# Patient Record
Sex: MALE | Race: WHITE | Employment: FULL TIME | URBAN - METROPOLITAN AREA
[De-identification: names, ages, dates, MRNs, and addresses within clinical notes are randomized per-mention and may not be internally consistent; named-entity substitution may affect disease eponyms.]

---

## 2020-07-21 ENCOUNTER — HOSPITAL ENCOUNTER (OUTPATIENT)
Age: 45
Discharge: HOME | End: 2020-07-21
Payer: COMMERCIAL

## 2020-07-22 ENCOUNTER — HOSPITAL ENCOUNTER (OUTPATIENT)
Age: 45
Discharge: HOME | End: 2020-07-22
Payer: COMMERCIAL

## 2020-07-22 PROCEDURE — 87109 MYCOPLASMA: CPT

## 2020-07-28 LAB
Lab: NORMAL
REPORT: NORMAL
THIS TEST SENT TO: NORMAL

## 2022-08-07 ENCOUNTER — HOSPITAL ENCOUNTER (EMERGENCY)
Age: 47
Discharge: HOME | End: 2022-08-07
Attending: EMERGENCY MEDICINE
Payer: COMMERCIAL

## 2022-08-07 VITALS
DIASTOLIC BLOOD PRESSURE: 88 MMHG | WEIGHT: 219.2 LBS | RESPIRATION RATE: 18 BRPM | HEIGHT: 73 IN | HEART RATE: 100 BPM | OXYGEN SATURATION: 98 % | TEMPERATURE: 99.4 F | SYSTOLIC BLOOD PRESSURE: 130 MMHG | BODY MASS INDEX: 29.05 KG/M2

## 2022-08-07 DIAGNOSIS — R19.7 NAUSEA VOMITING AND DIARRHEA: Primary | ICD-10-CM

## 2022-08-07 DIAGNOSIS — R11.2 NAUSEA VOMITING AND DIARRHEA: Primary | ICD-10-CM

## 2022-08-07 LAB
ABSOLUTE EOS #: ABNORMAL K/UL (ref 0–0.4)
ABSOLUTE LYMPH #: 0.49 K/UL (ref 1–4.8)
ABSOLUTE MONO #: 0.33 K/UL (ref 0–1)
ALBUMIN SERPL-MCNC: 4.1 G/DL (ref 3.5–5.2)
ALP BLD-CCNC: 109 U/L (ref 40–129)
ALT SERPL-CCNC: 72 U/L (ref 5–41)
ANION GAP SERPL CALCULATED.3IONS-SCNC: 10 MMOL/L (ref 9–17)
AST SERPL-CCNC: 41 U/L
BASOPHILS # BLD: ABNORMAL % (ref 0–2)
BASOPHILS ABSOLUTE: ABNORMAL K/UL (ref 0–0.2)
BILIRUB SERPL-MCNC: 0.81 MG/DL (ref 0.3–1.2)
BUN BLDV-MCNC: 19 MG/DL (ref 6–20)
BUN/CREAT BLD: 17 (ref 9–20)
CALCIUM SERPL-MCNC: 9 MG/DL (ref 8.6–10.4)
CHLORIDE BLD-SCNC: 106 MMOL/L (ref 98–107)
CO2: 26 MMOL/L (ref 20–31)
CREAT SERPL-MCNC: 1.12 MG/DL (ref 0.7–1.2)
EOSINOPHILS RELATIVE PERCENT: ABNORMAL % (ref 0–5)
GFR AFRICAN AMERICAN: >60 ML/MIN
GFR NON-AFRICAN AMERICAN: >60 ML/MIN
GFR SERPL CREATININE-BSD FRML MDRD: ABNORMAL ML/MIN/{1.73_M2}
GLUCOSE BLD-MCNC: 138 MG/DL (ref 70–99)
HCT VFR BLD CALC: 49.4 % (ref 41–53)
HEMOGLOBIN: 16.4 G/DL (ref 13.5–17.5)
LYMPHOCYTES # BLD: 6 % (ref 13–44)
MCH RBC QN AUTO: 27.1 PG (ref 26–34)
MCHC RBC AUTO-ENTMCNC: 33.2 G/DL (ref 31–37)
MCV RBC AUTO: 81.6 FL (ref 80–100)
MONOCYTES # BLD: 4 % (ref 5–9)
MORPHOLOGY: ABNORMAL
PDW BLD-RTO: 13.6 % (ref 12.1–15.2)
PLATELET # BLD: 186 K/UL (ref 140–450)
POTASSIUM SERPL-SCNC: 4.1 MMOL/L (ref 3.7–5.3)
RBC # BLD: 6.05 M/UL (ref 4.5–5.9)
SARS-COV-2, RAPID: NOT DETECTED
SEG NEUTROPHILS: 90 % (ref 39–75)
SEGMENTED NEUTROPHILS ABSOLUTE COUNT: 7.38 K/UL (ref 2.1–6.5)
SODIUM BLD-SCNC: 142 MMOL/L (ref 135–144)
SPECIMEN DESCRIPTION: NORMAL
TOTAL PROTEIN: 6.6 G/DL (ref 6.4–8.3)
WBC # BLD: 8.2 K/UL (ref 3.5–11)

## 2022-08-07 PROCEDURE — 2580000003 HC RX 258: Performed by: EMERGENCY MEDICINE

## 2022-08-07 PROCEDURE — C9803 HOPD COVID-19 SPEC COLLECT: HCPCS

## 2022-08-07 PROCEDURE — 6370000000 HC RX 637 (ALT 250 FOR IP): Performed by: EMERGENCY MEDICINE

## 2022-08-07 PROCEDURE — 87635 SARS-COV-2 COVID-19 AMP PRB: CPT

## 2022-08-07 PROCEDURE — 99284 EMERGENCY DEPT VISIT MOD MDM: CPT

## 2022-08-07 PROCEDURE — 85025 COMPLETE CBC W/AUTO DIFF WBC: CPT

## 2022-08-07 PROCEDURE — 6360000002 HC RX W HCPCS: Performed by: EMERGENCY MEDICINE

## 2022-08-07 PROCEDURE — 80053 COMPREHEN METABOLIC PANEL: CPT

## 2022-08-07 PROCEDURE — 96374 THER/PROPH/DIAG INJ IV PUSH: CPT

## 2022-08-07 RX ORDER — ALPRAZOLAM 1 MG/1
1 TABLET ORAL PRN
COMMUNITY

## 2022-08-07 RX ORDER — TRAZODONE HYDROCHLORIDE 50 MG/1
50 TABLET ORAL NIGHTLY PRN
COMMUNITY
Start: 2022-02-20

## 2022-08-07 RX ORDER — ONDANSETRON 4 MG/1
4 TABLET, ORALLY DISINTEGRATING ORAL ONCE
Status: COMPLETED | OUTPATIENT
Start: 2022-08-07 | End: 2022-08-07

## 2022-08-07 RX ORDER — ONDANSETRON 2 MG/ML
4 INJECTION INTRAMUSCULAR; INTRAVENOUS ONCE
Status: COMPLETED | OUTPATIENT
Start: 2022-08-07 | End: 2022-08-07

## 2022-08-07 RX ORDER — 0.9 % SODIUM CHLORIDE 0.9 %
1000 INTRAVENOUS SOLUTION INTRAVENOUS ONCE
Status: COMPLETED | OUTPATIENT
Start: 2022-08-07 | End: 2022-08-07

## 2022-08-07 RX ORDER — ONDANSETRON 4 MG/1
4 TABLET, ORALLY DISINTEGRATING ORAL EVERY 8 HOURS PRN
Qty: 6 TABLET | Refills: 0 | Status: SHIPPED | OUTPATIENT
Start: 2022-08-07

## 2022-08-07 RX ORDER — ESCITALOPRAM OXALATE 20 MG/1
20 TABLET ORAL DAILY
COMMUNITY
Start: 2021-10-14

## 2022-08-07 RX ADMIN — SODIUM CHLORIDE 999 ML: 9 INJECTION, SOLUTION INTRAVENOUS at 16:21

## 2022-08-07 RX ADMIN — ONDANSETRON 4 MG: 4 TABLET, ORALLY DISINTEGRATING ORAL at 19:49

## 2022-08-07 RX ADMIN — ONDANSETRON 4 MG: 2 INJECTION INTRAMUSCULAR; INTRAVENOUS at 16:25

## 2022-08-07 ASSESSMENT — PAIN DESCRIPTION - DESCRIPTORS: DESCRIPTORS: ACHING

## 2022-08-07 ASSESSMENT — PAIN SCALES - GENERAL: PAINLEVEL_OUTOF10: 3

## 2022-08-07 ASSESSMENT — PAIN - FUNCTIONAL ASSESSMENT: PAIN_FUNCTIONAL_ASSESSMENT: 0-10

## 2022-08-07 ASSESSMENT — PAIN DESCRIPTION - LOCATION: LOCATION: GENERALIZED

## 2022-08-07 NOTE — LETTER
[facility] ED  [address]  Phone: [phone]               August 7, 2022    Patient: Cee Miramontes   YOB: 1975   Date of Visit: 8/7/2022       To Whom It May Concern:    Cee Miramontes was seen and treated in our emergency department on 8/7/2022. He may return to work on 08/08/2022.       Sincerely,       Albina Patino RN         Signature:__________________________________

## 2022-08-07 NOTE — ED PROVIDER NOTES
eMERGENCY dEPARTMENT eNCOUnter      [address] [facility]    Chief Complaint   Patient presents with    Diarrhea     Pt states he has had 8 loose, watery, slimy stools and 6 episodes of vomiting since 10 am today, c/o body aches from the hips down. HPI    Jocelyn Romero is a 42 y.o. male who presents to ED from home. By car. With complaint of vomiting and diarrhea. Onset since this morning. Patient states that he started having diarrhea last night and continued today. Patient had multiple diarrheal bowel movements. Patient also has been vomiting. Patient has abdominal cramping. REVIEW OF SYSTEMS    All systems reviewed and positives are in the HPI    PAST MEDICAL HISTORY    Past Medical History:   Diagnosis Date    Brain cyst     history of    Headache(784.0)     20 per month, 4/10 severity    Kidney stone     Migraines     5 per month, 8/10 severity    Numbness     hands    Tingling     fingers       SURGICAL HISTORY    Past Surgical History:   Procedure Laterality Date    [facility] [place]       CURRENT MEDICATIONS    Current Outpatient Rx   Medication Sig Dispense Refill    escitalopram (LEXAPRO) 20 MG tablet Take 20 mg by mouth in the morning. traZODone (DESYREL) 50 MG tablet Take 50 mg by mouth nightly as needed      ondansetron (ZOFRAN ODT) 4 MG disintegrating tablet Take 1 tablet by mouth every 8 hours as needed for Nausea or Vomiting 6 tablet 0    ALPRAZolam (XANAX) 1 MG tablet Take 1 mg by mouth as needed. loratadine-pseudoephedrine (CLARITIN-D 24 HOUR)  MG per tablet Take 1 tablet by mouth daily 30 tablet 0    tamsulosin (FLOMAX) 0.4 MG capsule Take 1 capsule by mouth daily. 30 capsule 0    naproxen (NAPROSYN) 500 MG tablet Take 1 tablet by mouth 2 times daily for 7 days.  14 tablet 0       ALLERGIES    No Known Allergies    FAMILY HISTORY    Family History   Problem Relation Age of Onset    Other Mother         diverticulitis    High tolerated. ED Medications administered this visit:    Medications   ondansetron (ZOFRAN-ODT) disintegrating tablet 4 mg (has no administration in time range)   0.9 % sodium chloride bolus (0 mLs IntraVENous Stopped 8/7/22 1830)   ondansetron (ZOFRAN) injection 4 mg (4 mg IntraVENous Given 8/7/22 1625)       New Prescriptions from this visit:    New Prescriptions    ONDANSETRON (ZOFRAN ODT) 4 MG DISINTEGRATING TABLET    Take 1 tablet by mouth every 8 hours as needed for Nausea or Vomiting       Follow-up:  [facility] ED  [address] [address]  [phone]    As needed, If symptoms worsen      Final Impression:   1.  Nausea vomiting and diarrhea               (Please note that portions of this note were completed with a voice recognition program.  Efforts were made to edit the dictations but occasionally words are mis-transcribed.)      (Please note that portions of this note were completed with a voice recognition program.  Efforts were made to edit the dictations but occasionally words are mis-transcribed.)        Hollie Farah MD  08/07/22 [id number]